# Patient Record
Sex: MALE | Race: WHITE | NOT HISPANIC OR LATINO | URBAN - METROPOLITAN AREA
[De-identification: names, ages, dates, MRNs, and addresses within clinical notes are randomized per-mention and may not be internally consistent; named-entity substitution may affect disease eponyms.]

---

## 2017-12-18 ENCOUNTER — AMBULATORY - HEALTHEAST (OUTPATIENT)
Dept: CARDIOLOGY | Facility: CLINIC | Age: 52
End: 2017-12-18

## 2017-12-18 ENCOUNTER — OFFICE VISIT - HEALTHEAST (OUTPATIENT)
Dept: CARDIOLOGY | Facility: CLINIC | Age: 52
End: 2017-12-18

## 2017-12-18 DIAGNOSIS — R07.89 ATYPICAL CHEST PAIN: ICD-10-CM

## 2017-12-18 ASSESSMENT — MIFFLIN-ST. JEOR: SCORE: 1857.77

## 2019-11-14 ENCOUNTER — COMMUNICATION - HEALTHEAST (OUTPATIENT)
Dept: SURGERY | Facility: CLINIC | Age: 54
End: 2019-11-14

## 2019-11-19 ENCOUNTER — COMMUNICATION - HEALTHEAST (OUTPATIENT)
Dept: SURGERY | Facility: CLINIC | Age: 54
End: 2019-11-19

## 2019-11-19 ENCOUNTER — HOSPITAL ENCOUNTER (OUTPATIENT)
Dept: CT IMAGING | Facility: CLINIC | Age: 54
Discharge: HOME | End: 2019-11-19
Attending: SURGERY

## 2019-11-19 ENCOUNTER — AMBULATORY - HEALTHEAST (OUTPATIENT)
Dept: SURGERY | Facility: CLINIC | Age: 54
End: 2019-11-19

## 2019-11-19 DIAGNOSIS — K57.92 DIVERTICULITIS: ICD-10-CM

## 2019-12-03 ENCOUNTER — HOSPITAL ENCOUNTER (OUTPATIENT)
Dept: CT IMAGING | Facility: HOSPITAL | Age: 54
Discharge: HOME | End: 2019-12-03
Admitting: RADIOLOGY

## 2019-12-03 ENCOUNTER — HOSPITAL ENCOUNTER (OUTPATIENT)
Dept: INTERVENTIONAL RADIOLOGY/VASCULAR | Facility: HOSPITAL | Age: 54
Discharge: HOME | End: 2019-12-03

## 2019-12-03 DIAGNOSIS — L02.91 ABSCESS: ICD-10-CM

## 2019-12-19 ENCOUNTER — HOSPITAL ENCOUNTER (OUTPATIENT)
Dept: INTERVENTIONAL RADIOLOGY/VASCULAR | Facility: HOSPITAL | Age: 54
Discharge: HOME | End: 2019-12-19
Attending: PHYSICIAN ASSISTANT | Admitting: RADIOLOGY

## 2019-12-19 ENCOUNTER — OFFICE VISIT - HEALTHEAST (OUTPATIENT)
Dept: SURGERY | Facility: CLINIC | Age: 54
End: 2019-12-19

## 2019-12-19 DIAGNOSIS — L98.8 FISTULA: ICD-10-CM

## 2019-12-19 DIAGNOSIS — K57.20 COLONIC DIVERTICULAR ABSCESS: ICD-10-CM

## 2019-12-19 ASSESSMENT — MIFFLIN-ST. JEOR
SCORE: 1809.24
SCORE: 1767.05

## 2020-02-27 ENCOUNTER — OFFICE VISIT - HEALTHEAST (OUTPATIENT)
Dept: SURGERY | Facility: CLINIC | Age: 55
End: 2020-02-27

## 2020-02-27 DIAGNOSIS — K57.20 COLONIC DIVERTICULAR ABSCESS: ICD-10-CM

## 2021-05-31 VITALS — WEIGHT: 223 LBS | BODY MASS INDEX: 31.92 KG/M2 | HEIGHT: 70 IN

## 2021-06-02 ENCOUNTER — RECORDS - HEALTHEAST (OUTPATIENT)
Dept: ADMINISTRATIVE | Facility: CLINIC | Age: 56
End: 2021-06-02

## 2021-06-03 VITALS — WEIGHT: 203 LBS | BODY MASS INDEX: 29.06 KG/M2 | HEIGHT: 70 IN

## 2021-06-03 VITALS
TEMPERATURE: 98.7 F | WEIGHT: 212.3 LBS | OXYGEN SATURATION: 96 % | RESPIRATION RATE: 14 BRPM | HEIGHT: 70 IN | SYSTOLIC BLOOD PRESSURE: 122 MMHG | HEART RATE: 54 BPM | DIASTOLIC BLOOD PRESSURE: 70 MMHG | BODY MASS INDEX: 30.39 KG/M2

## 2021-06-03 VITALS — WEIGHT: 203 LBS | BODY MASS INDEX: 29.13 KG/M2

## 2021-06-03 NOTE — TELEPHONE ENCOUNTER
Pt scheduled for CT scan at 12:30pm at [facility]. Dr. Aldridge will call pt with results when available.

## 2021-06-03 NOTE — TELEPHONE ENCOUNTER
"Pt called with an update after his surgery. He was seen in the ED on 11/7 to 11/14 for acute diverticulitis and evidence of microperforation.  He had a scheduled follow up with Dr. Aldridge on 11/21/19 but called today worried about worsening symptoms. We spoke last week about his LLQ abdominal pain that the pt attributed to an increased activity level.    Pt reports that on Sunday his pain worsened and he developed a fever of 102.5. Pt states he is very fatigued. Denies any vomiting but states he has some nausea. Bowel movements have been very \"irregualar\" loose stools. Last night pt called the on-call surgeon when his temp was at 101.5. Was instructed to call the office today to inform surgeon and get a possible CT scan.     This morning pt states his temp is 99.9. He has been taking Tylenol q 4-6 hours. Informed pt I would send message to Dr. Aldridge and call him back with recommendations.         "

## 2021-06-03 NOTE — TELEPHONE ENCOUNTER
Spoke with pt about his abdominal pain. Overall he is doing well after his hospital stay. He has been eating a mostly bland regular diet. This morning had his first full formed stool before since leaving the hospital. After the bowel movement he noticed a pain in his LLQ. Notices it more when he stands up. States it is comparable to gas pain. Pt has no other complaints. Denies fever/chils/nausea or vomiting. Pt's activity has been pretty minimal besides some walking and working from home. Did take a road trip to [place] last weekend. Pt did report that he was straining when having a bowel movement. Discussed that this action could have caused a flare in his abdominal pain. He will continue to monitor for any increasing pain or change of symptoms. Pt does have a scheduled follow up with Dr. Aldridge on 11/21/19.

## 2021-06-03 NOTE — PRE-PROCEDURE
Procedure Name: abscessogram  Date/Time: 12/3/2019 10:11 AM  Written consent obtained?: Yes  Risks and benefits: Risks, benefits and alternatives were discussed  Consent given by: patient  Expected level of sedation: moderate (as needed)  ASA Class: Class 2- mild systemic disease, no acute problems, no functional limitations  Mallampati: Grade 2- soft palate, base of uvula, tonsillar pillars, and portion of posterior pharyngeal wall visible  Patient states understanding of procedure being performed: Yes  Patient's understanding of procedure matches consent: Yes  Procedure consent matches procedure scheduled: Yes  Appropriately NPO: yes  Lungs: lungs clear with good breath sounds bilaterally  Heart: normal heart sounds and rate  History & Physical reviewed: History and physical reviewed and no updates needed  Statement of review: I have reviewed the lab findings, diagnostic data, medications, and the plan for sedation

## 2021-06-04 VITALS
HEART RATE: 60 BPM | BODY MASS INDEX: 31.57 KG/M2 | WEIGHT: 220 LBS | DIASTOLIC BLOOD PRESSURE: 72 MMHG | OXYGEN SATURATION: 99 % | SYSTOLIC BLOOD PRESSURE: 136 MMHG

## 2021-06-04 NOTE — PRE-PROCEDURE
Procedure Name: abscessogram  Date/Time: 12/19/2019 9:44 AM  Written consent obtained?: Yes  Risks and benefits: Risks, benefits and alternatives were discussed  Consent given by: patient  Expected level of sedation: moderate  ASA Class: Class 3- Severe systemic disease, definite functional limitations  Mallampati: Grade 2- soft palate, base of uvula, tonsillar pillars, and portion of posterior pharyngeal wall visible  Patient states understanding of procedure being performed: Yes  Patient's understanding of procedure matches consent: Yes  Procedure consent matches procedure scheduled: Yes  Appropriately NPO: yes  Lungs: lungs clear with good breath sounds bilaterally  Heart: normal heart sounds and rate  History & Physical reviewed: History and physical reviewed and no updates needed  Statement of review: I have reviewed the lab findings, diagnostic data, medications, and the plan for sedation

## 2021-06-04 NOTE — PROGRESS NOTES
"Jama Toro is status post drain placement for a pericolonic abscess arising from sigmoid diverticulitis.  His initial episode of diverticulitis was early November, with abscess developing several weeks afterwards.  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  He underwent sinogram earlier today, which demonstrated no residual abscess cavity and no residual fistula to the colon.  Denies any fevers or chills, no bloody stools and no abdominal pain.    EXAM:  /70   Pulse (!) 54   Temp 98.7  F (37.1  C)   Resp 14   Ht 5' 10\" (1.778 m)   Wt 212 lb 4.8 oz (96.3 kg)   SpO2 96%   BMI 30.46 kg/m    GENERAL: Well developed male, No acute distress, pleasant and conversant   EYES: Pupils equal, round and reactive, no scleral icterus  ABDOMEN: Soft, nondistended.      ASSESSMENT AND PLAN:  Jama Toro is doing well and appears to have healed his colonic fistula and pericolonic abscess.  We discussed the potential neck steps including surgical resection of the sigmoid colon to prevent further potential episodes of diverticulitis, versus observation.  I explained the risks and benefits of both observation as well as sigmoid colectomy.  Given the rather unpleasant first experience he had with diverticulitis, he is keen on avoiding a second episode.  He would therefore like to pursue sigmoid colectomy.  We will plan on seeing him back in roughly 2 months time once further healing and resolution of inflammation has occurred to discuss the specifics of surgery, with a plan for sigmoid colectomy sometime in March 2020.  He underwent a colonoscopy this past summer with no evidence of polyps, will not require a second colonoscopy prior to his operation.    Marshall Aldridge MD  [phone]  [facility]  "

## 2021-06-06 NOTE — PROGRESS NOTES
Jama Toro is here for follow-up of a perforated sigmoid diverticulitis from November of last year.  This was managed initially with a drain and antibiotics, with successful resolution of both the perforation and colonic fistula.  He returns today for discussion regarding surgery.  He notes that since I last follow-up in December, he has really had no symptoms.  Can still feel on occasion sensation of something passing through when he has a bowel movement, but is otherwise asymptomatic.  While he was initially interested in surgery at her last visit in December, he is more interested in learning about conservative management at her current visit.        EXAM:  /72 (Patient Site: Right Arm, Patient Position: Sitting, Cuff Size: Adult Regular)   Pulse 60   Wt 220 lb (99.8 kg)   SpO2 99%   BMI 31.57 kg/m    GENERAL: Well developed male, No acute distress, pleasant and conversant     ASSESSMENT AND PLAN:  Jama Toro is doing well with no further evidence or activity of diverticulitis or recurrent abscess.  We again reviewed the risks as well as benefits of continued observation, namely the risk of subsequent episodes of diverticulitis versus the potential that those episodes may not be as severe and may not require surgery or intervention.  We also discussed once more the risks associated with surgery, including infection, bleeding, leakage and potential benefits of surgery such as avoiding future episodes of diverticulitis.      Approximately 15 minutes spent in discussion regarding risks and benefits of surgery versus observation, at the end of which he has elected to pursue observation for the current time.  He is aware he can contact us in the future with further questions or concerns, and is well aware of the signs and symptoms of diverticulitis should they recur.      Marshall Aldridge MD  [phone]  [facility]

## 2021-06-16 PROBLEM — K57.92 DIVERTICULITIS: Status: ACTIVE | Noted: 2019-11-04

## 2021-06-16 PROBLEM — R07.89 ATYPICAL CHEST PAIN: Status: ACTIVE | Noted: 2017-12-18

## 2021-06-16 PROBLEM — R03.0 TRANSIENT ELEVATED BLOOD PRESSURE: Status: ACTIVE | Noted: 2017-07-27

## 2021-06-16 PROBLEM — L02.91 ABSCESS: Status: ACTIVE | Noted: 2019-11-19

## 2021-06-16 PROBLEM — I49.3 PVC (PREMATURE VENTRICULAR CONTRACTION): Status: ACTIVE | Noted: 2018-06-12

## 2021-06-25 NOTE — PROGRESS NOTES
Progress Notes by Uli Green DO at 2017  3:20 PM     Author: Uli Green DO Service: -- Author Type: Physician    Filed: 2017  3:48 PM Encounter Date: 2017 Status: Signed    : Uli Green DO (Physician)           Click to link to [facility]     [facility] NOTE    Thank you, Dr. Zapata, for asking the [facility] team to see Mr. Jama Toro to evaluate chest pain.      Assessment/Recommendations   Assessment:    1. Atypical Chest Pain.  Substernal.  Non-exertional.     2. Anxiety disorder    Plan:  1. ECG exercise stress testing  2. He will be called with results       History of Present Illness    Mr. Jama Toro is a 52 y.o. male with no prior cardiac history who presents to cardiology rapid access clinic in consultation for atypical chest pain symptoms.       Recent states over the past 48 hours he has had to episodes of mild pain in his chest.  Pain is not associated with exertion.  Has not improved with rest.  Currently he is symptom-free.  Last episode of chest discomfort was in the AM today.  Patient denies any dyspnea exertion, lightheadedness, near syncope or syncopal episodes.  He has no history of palpitations.  There is no family history of premature coronary artery disease.  His mother does have A. Fib.      Patient has no prior history of cardiac surgery.     He was evaluated in the emergency department today.  Provider note was reviewed.  Labs were also reviewed including serial troponins.  ECGs were reviewed.    ECG:  Personally reviewed. 2017.  Sinus bradycardia, incomplete right bundle branch block. No change.     ECHO (personnaly reviewed):     Prior Stress Testin2010 ([place]).  Normal stress ECG       Physical Examination Review of Systems   Vitals:    17 1522   BP: 136/82   Pulse: 74   Resp: 18     Body mass index is 32 kg/(m^2).  Wt Readings from Last 3 Encounters:    12/18/17 215 lb (97.5 kg)   07/24/17 215 lb (97.5 kg)       General Appearance:   no distress, normal body habitus   ENT/Mouth: membranes moist, no oral lesions or bleeding gums.      EYES:  no scleral icterus, normal conjunctivae   Neck: no carotid bruits or thyromegaly   Chest/Lungs:   lungs are clear to auscultation, no rales or wheezing, no sternal scar, equal chest wall expansion    Cardiovascular:   Regular. Normal first and second heart sounds with no murmurs, rubs, or gallops; the carotid, radial and posterior tibial pulses are intact, Jugular venous pressure normal, no edema bilaterally    Abdomen:  no organomegaly, masses, bruits, or tenderness; bowel sounds are present   Extremities: no cyanosis or clubbing   Skin: no xanthelasma, warm.    Neurologic: normal gait, normal  bilateral, no tremors     Psychiatric: alert and oriented x3, calm     General: WNL  Eyes: WNL  Ears/Nose/Throat: WNL  Lungs: WNL  Heart: WNL  Stomach: WNL  Bladder: WNL  Muscle/Joints: WNL  Skin: WNL  Nervous System: WNL  Mental Health: WNL     Blood: WNL       Medical History  Surgical History Family History Social History   Past Medical History:   Diagnosis Date   ? Anxiety        Past Surgical History:   Procedure Laterality Date   ? APPENDECTOMY  2006   ? HIP SURGERY        Family History   Problem Relation Age of Onset   ? Atrial fibrillation Mother     Social History     Social History   ? Marital status:      Spouse name: N/A   ? Number of children: N/A   ? Years of education: N/A     Occupational History   ? Not on file.     Social History Main Topics   ? Smoking status: Not on file   ? Smokeless tobacco: Not on file   ? Alcohol use Not on file   ? Drug use: Not on file   ? Sexual activity: Not on file     Other Topics Concern   ? Not on file     Social History Narrative   ? No narrative on file          Medications  Allergies   Current Outpatient Prescriptions   Medication Sig Dispense Refill   ? cholecalciferol,  vitamin D3, (CHOLECALCIFEROL) 1,000 unit tablet Take 1,000 Units by mouth.     ? citalopram (CELEXA) 20 MG tablet Take 20 mg by mouth.     ? LORazepam (ATIVAN) 0.5 MG tablet Take 0.5 mg by mouth.     ? OMEPRAZOLE (PRILOSEC ORAL) Take by mouth.       No current facility-administered medications for this visit.       No Known Allergies      Lab Results    Chemistry/lipid CBC Cardiac Enzymes/BNP/TSH/INR   Lab Results   Component Value Date    CREATININE 1.09 12/18/2017    BUN 19 12/18/2017    K 3.9 12/18/2017     12/18/2017     (H) 12/18/2017    CO2 23 12/18/2017    Lab Results   Component Value Date    WBC 7.3 12/18/2017    HGB 14.4 12/18/2017    HCT 41.8 12/18/2017    MCV 86 12/18/2017     12/18/2017    Lab Results   Component Value Date    TROPONINI 0.02 12/18/2017

## 2021-06-27 ENCOUNTER — HEALTH MAINTENANCE LETTER (OUTPATIENT)
Age: 56
End: 2021-06-27

## 2021-07-02 NOTE — H&P
H&P by Camryn Barrera PA-C at 12/3/2019 11:00 AM     Author: Camryn Barrera PA-C Service: Interventional Radiology Author Type: Physician Assistant    Filed: 12/3/2019 10:11 AM Date of Service: 12/3/2019 11:00 AM Status: Signed    : Camryn Barrera PA-C (Physician Assistant)         Interventional Radiology - Pre-Procedure Note:  12/3/2019    Procedure Requested: abscessogram  Requested by: Nicholas    History and Physical Reviewed: H&P documented within 30 days (by Dr. Couch on 11/22/19).  I have personally reviewed the patient's medical history and have updated the medical record as necessary.    Brief HPI: Jama Toro is a 54 y.o. old male with recent complicated diverticulitis with abscess s/p CT guided LLQ pericolonic 10F drain placement 11/20/19. Presenting for drain follow up.    Patient instructed to flush drain daily with 10cc NS daily at discharge -which he did until last week Tuesday, however, he stopped at that point because of leaking at drain exit site with flushing and because the flushing port on the 3 way stop cock had accidentally been removed. Reports minimal daily drain OPs of only a couple mL (<5mL daily). Denies leaking at drain exit site. Has been feeling a lot better and more like himself this week.     IMAGING:  CT abd/pelvis completed today.    NPO: midnight.  ANTICOAGULANTS: none  ANTIBIOTICS: flagyl.     ALLERGIES  Patient has no known allergies.    LABS:  INR (no units)   Date Value   11/20/2019 1.26 (H)     Hemoglobin (g/dL)   Date Value   11/23/2019 12.5 (L)     Platelets (thou/uL)   Date Value   11/23/2019 369     Creatinine (mg/dL)   Date Value   11/22/2019 0.84     Potassium (mmol/L)   Date Value   11/22/2019 4.5       EXAM:  /84   Pulse (!) 52   Temp 98.7  F (37.1  C)   Resp 16   Wt 203 lb (92.1 kg)   SpO2 98%   BMI 29.13 kg/m    General: Stable. In no acute distress.  Neuro: A&O x 3. Moves all extremities equally.  Resp:  Lungs clear to auscultation bilaterally.  Cardio: S1S2 and reg, without murmur, clicks or rubs  Abdomen: Soft, non-distended, non-tender, LLQ drain to SANTI bulb with minimal tan/bloody OP in tubing and bulb. Site non tender to palpation with dressing CDI.    Pre-Sedation Assessment:  Mallampati Airway Classification: Class 2: upper half of tonsil fossa visible  Previous reaction to anesthesia/sedation: no  Sedation plan based on assessment: Moderate  ASA Classification: ASA 2 - Patient with mild systemic disease with no functional limitations      ASSESSMENT/PLAN:   complicated diverticulitis with abscess s/p CT guided LLQ pericolonic 10F drain placement 11/20/19.     Abscessogram with possible drain reposition, exchange or removal with sedation as needed.    Procedure, risk/benefits, and sedation reviewed with pt/family. All questions answered. Consent obtained. OK to proceed with above radiology procedure.     Camryn Barrera  Interventional Radiology

## 2021-07-02 NOTE — H&P
H&P by Kamille Peterson CNP at 12/19/2019 10:00 AM     Author: Nicholas, Kamille J, CNP Service: Interventional Radiology Author Type: Nurse Practitioner    Filed: 12/19/2019  9:44 AM Date of Service: 12/19/2019 10:00 AM Status: Signed    : Kamille Peterson CNP (Nurse Practitioner)         Interventional Radiology - History and Physical  12/19/2019    Procedure Requested: Abscessogram  Requested by: Camryn Barrera PA-C    Brief HPI: Jama Toro is a 54 y.o. old male with history of HTN and hospitalization for complicated diverticulitis with abscess s/p CT guided LLQ pericolonic 10F drain placement 11/20/19. Last abscessogram done 12/3 which showed resolved abscess with persistent fistula to colon. Patient presents today for follow up abscessogram. Patient was instructed to stop flushing. He has noted very little output from drain, maybe 1 teaspoon since 12/3. Denies issues with drain leaking, pain, fevers, chills.     IMAGING:  [facility]  LOCATION: [facility]  DATE: 12/3/2019  ABSCESS TUBE CHECK     INTERVENTIONAL RADIOLOGIST: Micheal Melara MD     INDICATION: Diverticular abscess.     CONSENT: The risks, benefits and alternatives of planned procedure were discussed with the patient in detail. All questions were answered. Informed consent was given to proceed with the procedure.     CONTRAST: 7 ml  ANTIBIOTICS: None.  ADDITIONAL MEDICATIONS: None.     FLUOROSCOPIC TIME: 0.3 minutes.  DOSE: DAP:8 Gycm2.     COMPLICATIONS: No immediate complications.     PROCEDURE: Informed consent was obtained. The patient was then brought to the angiographic suite and placed supine on the table. Contrast was injected through the existing catheter and images obtained.      FINDINGS: Left lower quadrant pigtail catheter. No residual abscess cavity. There is a fistula to the adjacent sigmoid colon.     IMPRESSION:   1.  RESOLUTION OF DIVERTICULAR ABSCESS. PERSISTENT COLONIC FISTULA.     PLAN: PATIENT WAS INSTRUCTED  "TO STOP FLUSHING THE CATHETER. THE SANTI BULB WAS CHANGED TO GRAVITY DRAINAGE BAG. FOLLOW-UP TUBE CHECK IN 10-14 DAYS.                             NPO Status: MN  Anticoagulation/Antiplatelets/Bleeding tendencies: None  Antibiotics: Omnicef    Review of Systems: A comprehensive 10-point review of systems was performed. All systems were reviewed and negative with exception to those reported in the HPI.    PMH:  Past Medical History:   Diagnosis Date   ? Anxiety    ? Diverticulitis    ? Hypertension        PSH:  Past Surgical History:   Procedure Laterality Date   ? APPENDECTOMY  2006   ? HIP SURGERY         ALLERGIES  Patient has no known allergies.    MEDICATIONS:  Current Outpatient Medications on File Prior to Encounter   Medication Sig Dispense Refill   ? acetaminophen (TYLENOL) 500 MG tablet Take 1,000 mg by mouth every 6 (six) hours as needed for pain.     ? amLODIPine (NORVASC) 5 MG tablet Take 5 mg by mouth daily.     ? cefdinir (OMNICEF) 300 MG capsule Take 300 mg by mouth 2 (two) times a day.     ? cholecalciferol, vitamin D3, (CHOLECALCIFEROL) 1,000 unit tablet Take 1,000 Units by mouth daily.      ? citalopram (CELEXA) 20 MG tablet Take 20 mg by mouth every morning.     ? omeprazole (PRILOSEC) 20 MG capsule Take 20 mg by mouth daily before breakfast.       No current facility-administered medications on file prior to encounter.        LABS:  INR (no units)   Date Value   11/20/2019 1.26 (H)     Hemoglobin (g/dL)   Date Value   11/23/2019 12.5 (L)     Platelets (thou/uL)   Date Value   11/23/2019 369     Creatinine (mg/dL)   Date Value   11/22/2019 0.84     Potassium (mmol/L)   Date Value   11/22/2019 4.5     EXAM:  /80   Pulse 60   Temp 98.7  F (37.1  C) (Oral)   Resp 16   Ht 5' 10\" (1.778 m)   Wt 203 lb (92.1 kg)   SpO2 96%   BMI 29.13 kg/m    General: Stable. In no acute distress  Neuro: A&O x3.    Resp: Lungs CTA bilaterally.  Cardio: S1S2 and reg, without murmur, clicks or rubs.  Abdomen: " Pelvic drain exit site without erythema or drainage to gravity with scant amount of dark brown fluid.     Pre-Sedation Assessment:  Mallampati Airway Classification: Class 2: upper half of tonsil fossa visible  Previous reaction to anesthesia/sedation: no  Sedation plan based on assessment: Moderate  Sleep Apnea: no  Dentures: no  COPD: no  ASA Classification: ASA 3 - Patient with moderate systemic disease with functional limitations     ASSESSMENT:  54 year old male with diverticulitis with abscess s/p CT guided LLQ pericolonic 10F drain placement 11/20/19, persistent fistula to colon.     PLAN:    Abscessogram with possible drain reposition, exchange or removal with sedation as needed    The procedure, risks and moderate sedation were discussed with patient, all questions answered and patient agrees to proceed with the procedure.  Written consent obtained.    Kamille Peterson, CNP  Interventional Radiology  [phone]

## 2021-10-16 ENCOUNTER — HEALTH MAINTENANCE LETTER (OUTPATIENT)
Age: 56
End: 2021-10-16

## 2022-07-23 ENCOUNTER — HEALTH MAINTENANCE LETTER (OUTPATIENT)
Age: 57
End: 2022-07-23

## 2022-10-01 ENCOUNTER — HEALTH MAINTENANCE LETTER (OUTPATIENT)
Age: 57
End: 2022-10-01

## 2023-08-06 ENCOUNTER — HEALTH MAINTENANCE LETTER (OUTPATIENT)
Age: 58
End: 2023-08-06